# Patient Record
Sex: MALE | Race: OTHER | Employment: PART TIME | URBAN - METROPOLITAN AREA
[De-identification: names, ages, dates, MRNs, and addresses within clinical notes are randomized per-mention and may not be internally consistent; named-entity substitution may affect disease eponyms.]

---
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
## 2022-01-07 ENCOUNTER — HOSPITAL ENCOUNTER (EMERGENCY)
Age: 22
Discharge: HOME | End: 2022-01-07
Attending: EMERGENCY MEDICINE
Payer: MEDICAID

## 2022-01-07 ENCOUNTER — APPOINTMENT (OUTPATIENT)
Dept: GENERAL RADIOLOGY | Age: 22
End: 2022-01-07
Attending: EMERGENCY MEDICINE
Payer: MEDICAID

## 2022-01-07 VITALS
HEART RATE: 82 BPM | BODY MASS INDEX: 24.22 KG/M2 | SYSTOLIC BLOOD PRESSURE: 121 MMHG | TEMPERATURE: 98.5 F | HEIGHT: 71 IN | OXYGEN SATURATION: 100 % | DIASTOLIC BLOOD PRESSURE: 68 MMHG | WEIGHT: 173 LBS | RESPIRATION RATE: 22 BRPM

## 2022-01-07 DIAGNOSIS — R00.2 PALPITATIONS: Primary | ICD-10-CM

## 2022-01-07 DIAGNOSIS — F41.1 ANXIETY STATE: ICD-10-CM

## 2022-01-07 LAB — SARS-COV-2, COV2: NORMAL

## 2022-01-07 PROCEDURE — 71045 X-RAY EXAM CHEST 1 VIEW: CPT

## 2022-01-07 PROCEDURE — 93005 ELECTROCARDIOGRAM TRACING: CPT

## 2022-01-07 PROCEDURE — 99283 EMERGENCY DEPT VISIT LOW MDM: CPT

## 2022-01-07 PROCEDURE — U0005 INFEC AGEN DETEC AMPLI PROBE: HCPCS

## 2022-01-08 NOTE — DISCHARGE INSTRUCTIONS
Thank you! Thank you for allowing me to care for you in the emergency department. I sincerely hope that you are satisfied with your visit today. It is my goal to provide you with excellent care. Below you will find a list of your labs and imaging from your visit today. Should you have any questions regarding these results please do not hesitate to call the emergency department. Labs -   No results found for this or any previous visit (from the past 12 hour(s)). Radiologic Studies -   XR CHEST PORT   Final Result   Clear lungs. No effusion or pneumothorax. Normal heart and   mediastinum        CT Results  (Last 48 hours)      None          CXR Results  (Last 48 hours)                 01/07/22 2156  XR CHEST PORT Final result    Impression:  Clear lungs. No effusion or pneumothorax. Normal heart and   mediastinum       Narrative:  1 view                      If you feel that you have not received excellent quality care or timely care, please ask to speak to the nurse manager. Please choose us in the future for your continued health care needs. ------------------------------------------------------------------------------------------------------------  The exam and treatment you received in the Emergency Department were for an urgent problem and are not intended as complete care. It is important that you follow-up with a doctor, nurse practitioner, or physician assistant to:  (1) confirm your diagnosis,  (2) re-evaluation of changes in your illness and treatment, and  (3) for ongoing care. If your symptoms become worse or you do not improve as expected and you are unable to reach your usual health care provider, you should return to the Emergency Department. We are available 24 hours a day. Please take your discharge instructions with you when you go to your follow-up appointment. If you have any problem arranging a follow-up appointment, contact the Emergency Department immediately.     If a prescription has been provided, please have it filled as soon as possible to prevent a delay in treatment. Read the entire medication instruction sheet provided to you by the pharmacy. If you have any questions or reservations about taking the medication due to side effects or interactions with other medications, please call your primary care physician or contact the ER to speak with the charge nurse. Make an appointment with your family doctor or the physician you were referred to for follow-up of this visit as instructed on your discharge paperwork, as this is a mandatory follow-up. Return to the ER if you are unable to be seen or if you are unable to be seen in a timely manner. If you have any problem arranging the follow-up visit, contact the Emergency Department immediately.

## 2022-01-09 LAB
ATRIAL RATE: 72 BPM
CALCULATED P AXIS, ECG09: 80 DEGREES
CALCULATED R AXIS, ECG10: 90 DEGREES
CALCULATED T AXIS, ECG11: 58 DEGREES
DIAGNOSIS, 93000: NORMAL
P-R INTERVAL, ECG05: 182 MS
Q-T INTERVAL, ECG07: 368 MS
QRS DURATION, ECG06: 94 MS
QTC CALCULATION (BEZET), ECG08: 402 MS
SARS-COV-2, XPLCVT: NOT DETECTED
SOURCE, COVRS: NORMAL
VENTRICULAR RATE, ECG03: 72 BPM

## 2022-01-10 NOTE — ED PROVIDER NOTES
EMERGENCY DEPARTMENT HISTORY AND PHYSICAL EXAM      Date: 1/7/2022  Patient Name: Amena Gracia    History of Presenting Illness     Chief Complaint   Patient presents with    Shortness of Breath       History Provided By: Patient    HPI: Bria Gracia, 25 y.o. male with a past medical history significant No significant past medical history presents to the ED with cc of shortness of breath x 2 days. Patient states that he feels similar to when he was told his thyroid was not functioning properly. Patient states that he has never taken thyroid medication, however. He report palpitations, shortness of breath and a short temper, saying that he frequently wants to yell at people. There are no other complaints, changes, or physical findings at this time. PCP: Nelsy Stone MD    No current facility-administered medications on file prior to encounter. Current Outpatient Medications on File Prior to Encounter   Medication Sig Dispense Refill    SELENIUM PO Take  by mouth.  cholecalciferol (Vitamin D3) (1000 Units /25 mcg) tablet Take  by mouth daily.  zinc sulfate (ZINC-220 PO) Take  by mouth.  ascorbic acid, vitamin C, (Vitamin C) 250 mg tablet Take  by mouth. Past History     Past Medical History:  Past Medical History:   Diagnosis Date    Diabetes [facility])        Past Surgical History:  History reviewed. No pertinent surgical history. Family History:  History reviewed. No pertinent family history. Social History:  Social History     Tobacco Use    Smoking status: Light Tobacco Smoker    Smokeless tobacco: Never Used   Substance Use Topics    Alcohol use: Never    Drug use: Never       Allergies:  No Known Allergies      Review of Systems     Review of Systems   Constitutional: Negative for chills and fever. HENT: Negative for congestion and rhinorrhea. Eyes: Negative for photophobia and visual disturbance. Respiratory: Positive for shortness of breath.  Negative for cough. Cardiovascular: Positive for palpitations. Negative for chest pain. Gastrointestinal: Negative for abdominal pain, diarrhea, nausea and vomiting. Genitourinary: Negative for difficulty urinating and dysuria. Musculoskeletal: Negative for arthralgias and myalgias. Skin: Negative for color change and rash. Neurological: Negative for weakness and headaches. Psychiatric/Behavioral: Positive for agitation. Negative for dysphoric mood and sleep disturbance. Physical Exam     Physical Exam  Constitutional:       General: He is not in acute distress. Appearance: Normal appearance. He is not ill-appearing. HENT:      Head: Normocephalic and atraumatic. Right Ear: External ear normal.      Left Ear: External ear normal.      Nose: Nose normal.      Mouth/Throat:      Mouth: Mucous membranes are moist.   Eyes:      Extraocular Movements: Extraocular movements intact. Conjunctiva/sclera: Conjunctivae normal.      Pupils: Pupils are equal, round, and reactive to light. Cardiovascular:      Rate and Rhythm: Normal rate and regular rhythm. Pulses: Normal pulses. Pulmonary:      Effort: Pulmonary effort is normal. No respiratory distress. Breath sounds: Normal breath sounds. Abdominal:      General: Abdomen is flat. There is no distension. Musculoskeletal:         General: Normal range of motion. Cervical back: Normal range of motion. Skin:     General: Skin is warm and dry. Neurological:      General: No focal deficit present. Mental Status: He is alert and oriented to person, place, and time. Psychiatric:         Mood and Affect: Mood normal.         Behavior: Behavior normal.         Thought Content: Thought content normal.         Judgment: Judgment normal.         Lab and Diagnostic Study Results     Labs -   No results found for this or any previous visit (from the past 12 hour(s)).     Radiologic Studies -   @lastxrresult@  CT Results  (Last 50 hours)    None        CXR Results  (Last 48 hours)               01/07/22 2156  XR CHEST PORT Final result    Impression:  Clear lungs. No effusion or pneumothorax. Normal heart and   mediastinum       Narrative:  1 view                   Medical Decision Making   - I am the first provider for this patient. - I reviewed the vital signs, available nursing notes, past medical history, past surgical history, family history and social history. - Initial assessment performed. The patients presenting problems have been discussed, and they are in agreement with the care plan formulated and outlined with them. I have encouraged them to ask questions as they arise throughout their visit. Vital Signs-Reviewed the patient's vital signs. No data found. Records Reviewed: Nursing Notes    The patient presents with palpitations with a differential diagnosis of anxiety, hyperthyroidism, substance abuse/withdrawal, cardiac arrythmia, PE       ED Course:          Provider Notes (Medical Decision Making):   72-year-old otherwise healthy male presenting for evaluation of shortness of breath, palpitations over the course of the last couple of days. Patient states that he had similar symptoms last year and was told it was related to his thyroid. He is not currently taking any medication. On physical exam, patient resting comfortably in bed. He is not tachycardic. He has no lower extremity swelling. Lung sounds clear bilaterally. Patient is PERC negative. EKG, chest x-ray without abnormal findings. Do not suspect thyroid etiology of patient's symptoms based on history and physical exam.  Patient instructed to follow-up with his primary care physician if he desires further endocrinology evaluation. Patient instructed to follow-up with their primary care physician and return to the ED if they develops any new or worsening symptoms.     MDM       Procedures   Medical Decision Makingedical Decision Making  Performed by: Ana Ferro DO  PROCEDURES:  Procedures       Disposition   Disposition: Condition stable  DC- Adult Discharges: All of the diagnostic tests were reviewed and questions answered. Diagnosis, care plan and treatment options were discussed. The patient understands the instructions and will follow up as directed. The patients results have been reviewed with them. They have been counseled regarding their diagnosis. The patient verbally convey understanding and agreement of the signs, symptoms, diagnosis, treatment and prognosis and additionally agrees to follow up as recommended with their PCP in 24 - 48 hours. They also agree with the care-plan and convey that all of their questions have been answered. I have also put together some discharge instructions for them that include: 1) educational information regarding their diagnosis, 2) how to care for their diagnosis at home, as well a 3) list of reasons why they would want to return to the ED prior to their follow-up appointment, should their condition change. DC-The patient was given verbal palpitations instructions    Discharged    DISCHARGE PLAN:  1. Cannot display discharge medications since this patient is not currently admitted. 2.   Follow-up Information     Follow up With Specialties Details Why Contact Info    Fabian Delgadillo MD Internal Medicine Schedule an appointment as soon as possible for a visit   [facility]      [facility] Emergency Medicine  As needed, If symptoms worsen [address] [place] [id number] [phone]        3. Return to ED if worse   4. Discharge Medication List as of 1/7/2022 10:30 PM            Diagnosis     Clinical Impression:   1. Palpitations    2. Anxiety state        Attestations:    Ana Ferro DO    Please note that this dictation was completed with [employer], the computer voice recognition software. Quite often unanticipated grammatical, syntax, homophones, and other interpretive errors are inadvertently transcribed by the computer software. Please disregard these errors. Please excuse any errors that have escaped final proofreading. Thank you.

## 2022-12-10 ENCOUNTER — HOSPITAL ENCOUNTER (EMERGENCY)
Age: 22
Discharge: HOME | End: 2022-12-10
Payer: MEDICAID

## 2022-12-10 VITALS
HEIGHT: 71 IN | DIASTOLIC BLOOD PRESSURE: 61 MMHG | HEART RATE: 100 BPM | SYSTOLIC BLOOD PRESSURE: 99 MMHG | BODY MASS INDEX: 27.72 KG/M2 | OXYGEN SATURATION: 100 % | TEMPERATURE: 97.6 F | WEIGHT: 198 LBS | RESPIRATION RATE: 16 BRPM
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
## 2022-12-10 PROCEDURE — 99283 EMERGENCY DEPT VISIT LOW MDM: CPT

## 2022-12-10 PROCEDURE — 74011250637 HC RX REV CODE- 250/637: Performed by: NURSE PRACTITIONER

## 2022-12-10 RX ORDER — BACITRACIN 500 [USP'U]/G
OINTMENT TOPICAL 3 TIMES DAILY
Qty: 28 G | Refills: 0 | Status: SHIPPED | OUTPATIENT
Start: 2022-12-10 | End: 2022-12-14

## 2022-12-10 RX ORDER — IBUPROFEN 800 MG/1
800 TABLET ORAL
Status: COMPLETED | OUTPATIENT
Start: 2022-12-10 | End: 2022-12-10

## 2022-12-10 RX ORDER — NAPROXEN 500 MG/1
500 TABLET ORAL 2 TIMES DAILY WITH MEALS
Qty: 20 TABLET | Refills: 0 | Status: SHIPPED | OUTPATIENT
Start: 2022-12-10 | End: 2022-12-14

## 2022-12-10 RX ORDER — AMOXICILLIN AND CLAVULANATE POTASSIUM 875; 125 MG/1; MG/1
1 TABLET, FILM COATED ORAL 2 TIMES DAILY
Qty: 20 TABLET | Refills: 0 | Status: SHIPPED | OUTPATIENT
Start: 2022-12-10 | End: 2022-12-20

## 2022-12-10 RX ADMIN — IBUPROFEN 800 MG: 800 TABLET, FILM COATED ORAL at 20:18

## 2022-12-10 NOTE — Clinical Note
[address] EMERGENCY DEPARTMENT  [address] [facility] [address]  [phone]    Work/School Note    Date: 12/10/2022    To Whom It May concern:      Bria Gracia was seen and treated today in the emergency room by the following provider(s):  Nurse Practitioner: Linn Almeida NP. Avery Boyle is excused from work/school on 12/10/22. He is clear to return to work/school on 12/11/22.         Sincerely,          Sera Figueroa NP

## 2022-12-11 NOTE — ED TRIAGE NOTES
Pt was playing with his dog and dog bit patient in several spots including a puncture wound to right shin area, left forearm, right middle finger and upper right arm. Pt has given dog shots but is unsure which ones and has no records.

## 2022-12-11 NOTE — ED PROVIDER NOTES
EMERGENCY DEPARTMENT HISTORY AND PHYSICAL EXAM      Date: 12/10/2022  Patient Name: Eddie Gracia    History of Presenting Illness     Chief Complaint   Patient presents with    Dog Bite       History Provided By: Patient    HPI: Bria Gracia, 23 y.o. male c/o dog bite prior to arrival. He reports multiple abrasions and puncture wound to hands, upper arms, and leg. He reports getting in the middle of his pit bulls who were playing but started to get aggressive. He reports dogs have been vaccinated up to 12 weeks and have  not received any additional vaccinations at this time. He reports one dog is 1 and the other is 1years old. He reports being allergic to tetanus. Denies any fever, drainage, chills, edema, warmth to sites. Off note pt denies hx of diabetes. No on any medications     There are no other complaints, changes, or physical findings at this time. Past History     Past Medical History:  Past Medical History:   Diagnosis Date    Diabetes [facility])        Past Surgical History:  History reviewed. No pertinent surgical history. Family History:  Family History   Problem Relation Age of Onset    Hypertension Father        Social History:  Social History     Tobacco Use    Smoking status: Light Smoker    Smokeless tobacco: Never   Vaping Use    Vaping Use: Every day    Substances: Nicotine   Substance Use Topics    Alcohol use: Never    Drug use: Never       Allergies:  No Known Allergies    PCP: Randolph Cabral MD    No current facility-administered medications on file prior to encounter. Current Outpatient Medications on File Prior to Encounter   Medication Sig Dispense Refill    SELENIUM PO Take  by mouth. cholecalciferol (Vitamin D3) (1000 Units /25 mcg) tablet Take  by mouth daily. zinc sulfate (ZINC-220 PO) Take  by mouth. ascorbic acid, vitamin C, (Vitamin C) 250 mg tablet Take  by mouth. Review of Systems   Review of Systems   Constitutional:  Negative for chills and fever. Musculoskeletal:  Negative for arthralgias, gait problem and myalgias. Skin:  Positive for wound. Neurological:  Negative for weakness and numbness. All other systems reviewed and are negative. Physical Exam   Physical Exam  Vitals and nursing note reviewed. Constitutional:       General: He is not in acute distress. Appearance: Normal appearance. He is normal weight. He is not ill-appearing or toxic-appearing. HENT:      Head: Normocephalic and atraumatic. Nose: Nose normal.      Mouth/Throat:      Mouth: Mucous membranes are moist.   Eyes:      Extraocular Movements: Extraocular movements intact. Pupils: Pupils are equal, round, and reactive to light. Cardiovascular:      Rate and Rhythm: Normal rate and regular rhythm. Pulses: Normal pulses. Pulmonary:      Effort: Pulmonary effort is normal. No respiratory distress. Musculoskeletal:         General: Normal range of motion. Cervical back: Normal range of motion and neck supple. Skin:     General: Skin is warm and dry. Capillary Refill: Capillary refill takes less than 2 seconds. Findings: Abrasion and erythema present. Comments: Multiple abrasions with erythema noted to bilateral upper extremities. Puncture wound noted to left hand, and abrasion to right lower leg. 2 Plus distal pulses, less than 2-second capillary refill. Positive sensation bilaterally. No drainage, warmth, or edema  noted to site   Neurological:      Mental Status: He is alert and oriented to person, place, and time. Lab and Diagnostic Study Results   Labs -   No results found for this or any previous visit (from the past 12 hour(s)).     Radiologic Studies -   @lastxrresult@  CT Results  (Last 48 hours)      None          CXR Results  (Last 48 hours)      None            Medical Decision Making and ED Course   Differential Diagnosis & Medical Decision Making Provider Note: abrasion, laceration, puncture wound, infection    Patient afebrile not tachycardic SPO2 100% on room air. Hemodynamically stable no active bleeding abrasions noted to bilateral upper extremities and right lower extremity with 1 puncture wound noted to left hand. Clean with soap and water, irrigated with saline. Patient deferred tetanus shot as he reports he is allergic to it. Patient reports dogs and family dogs however has not had vaccinations tested 12 weeks. Patient advised to quarantine for signs and symptoms of rabies advised signs and symptoms to follow-up to emergency room increased risk for infection due to dog bite. Patient verbalized understanding stable at time of discharge      - I am the first provider for this patient. I reviewed the vital signs, available nursing notes, past medical history, past surgical history, family history and social history. The patients presenting problems have been discussed, and they are in agreement with the care plan formulated and outlined with them. I have encouraged them to ask questions as they arise throughout their visit. Vital Signs-Reviewed the patient's vital signs. Patient Vitals for the past 12 hrs:   Temp Pulse Resp BP SpO2   12/10/22 1949 97.6 °F (36.4 °C) 100 16 99/61 100 %       ED Course:        Procedures   Performed by: Roberta Mendez NP  Procedures      Disposition   Disposition: DC- Adult Discharges: All of the diagnostic tests were reviewed and questions answered. Diagnosis, care plan and treatment options were discussed. The patient understands the instructions and will follow up as directed. The patients results have been reviewed with them. They have been counseled regarding their diagnosis. The patient verbally convey understanding and agreement of the signs, symptoms, diagnosis, treatment and prognosis and additionally agrees to follow up as recommended with their PCP in 24 - 48 hours.   They also agree with the care-plan and convey that all of their questions have been answered. I have also put together some discharge instructions for them that include: 1) educational information regarding their diagnosis, 2) how to care for their diagnosis at home, as well a 3) list of reasons why they would want to return to the ED prior to their follow-up appointment, should their condition change. DISCHARGE PLAN:  1. Current Discharge Medication List        START taking these medications    Details   amoxicillin-clavulanate (Augmentin) 875-125 mg per tablet Take 1 Tablet by mouth two (2) times a day for 10 days. Qty: 20 Tablet, Refills: 0      naproxen (Naprosyn) 500 mg tablet Take 1 Tablet by mouth two (2) times daily (with meals) for 10 days. Qty: 20 Tablet, Refills: 0      bacitracin (BACITRACIN) 500 unit/gram oint Apply  to affected area three (3) times daily for 10 days. Apply to affected area  Qty: 28 g, Refills: 0           CONTINUE these medications which have NOT CHANGED    Details   SELENIUM PO Take  by mouth. cholecalciferol (Vitamin D3) (1000 Units /25 mcg) tablet Take  by mouth daily. zinc sulfate (ZINC-220 PO) Take  by mouth. ascorbic acid, vitamin C, (Vitamin C) 250 mg tablet Take  by mouth. 2.   Follow-up Information       Follow up With Specialties Details Why Contact Info    Paula Garcia MD Internal Medicine Physician Schedule an appointment as soon as possible for a visit in 2 days For wound re-check [facility]  [phone]            3. Return to ED if worse   4. Current Discharge Medication List        START taking these medications    Details   amoxicillin-clavulanate (Augmentin) 875-125 mg per tablet Take 1 Tablet by mouth two (2) times a day for 10 days. Qty: 20 Tablet, Refills: 0  Start date: 12/10/2022, End date: 12/20/2022      naproxen (Naprosyn) 500 mg tablet Take 1 Tablet by mouth two (2) times daily (with meals) for 10 days.   Qty: 20 Tablet, Refills: 0  Start date: 12/10/2022, End date: 12/20/2022      bacitracin (BACITRACIN) 500 unit/gram oint Apply  to affected area three (3) times daily for 10 days. Apply to affected area  Qty: 28 g, Refills: 0  Start date: 12/10/2022, End date: 12/20/2022             Diagnosis/Clinical Impression     Clinical Impression:   1. Dog bite, initial encounter        Attestations: Angella MILLER NP, am the primary clinician of record. Please note that this dictation was completed with "[employer] the [employer] voice recognition software. Quite often unanticipated grammatical, syntax, homophones, and other interpretive errors are inadvertently transcribed by the computer software. Please disregard these errors. Please excuse any errors that have escaped final proofreading. Thank you.

## 2022-12-13 PROBLEM — E11.9 DIABETES (HCC): Status: ACTIVE | Noted: 2022-12-13

## 2022-12-13 PROBLEM — R00.2 INTERMITTENT PALPITATIONS: Status: ACTIVE | Noted: 2022-12-13

## 2022-12-13 LAB
CREATININE, EXTERNAL: 0.84
LDL-C, EXTERNAL: 136
TOTAL CHOLESTEROL, NCHOLT: 192

## 2022-12-14 ENCOUNTER — OFFICE VISIT (OUTPATIENT)
Dept: INTERNAL MEDICINE CLINIC | Age: 22
End: 2022-12-14
Payer: MEDICAID

## 2022-12-14 VITALS
WEIGHT: 189 LBS | OXYGEN SATURATION: 99 % | HEIGHT: 71 IN | HEART RATE: 61 BPM | BODY MASS INDEX: 26.46 KG/M2 | DIASTOLIC BLOOD PRESSURE: 70 MMHG | TEMPERATURE: 98.1 F | SYSTOLIC BLOOD PRESSURE: 114 MMHG

## 2022-12-14 DIAGNOSIS — R79.89 LOW TSH LEVEL: ICD-10-CM

## 2022-12-14 DIAGNOSIS — R55 NEAR SYNCOPE: Primary | ICD-10-CM

## 2022-12-14 DIAGNOSIS — E78.5 DYSLIPIDEMIA: ICD-10-CM
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
## 2022-12-14 PROCEDURE — 99214 OFFICE O/P EST MOD 30 MIN: CPT | Performed by: INTERNAL MEDICINE

## 2022-12-14 NOTE — PROGRESS NOTES
Chief Complaint   Patient presents with    Chest Pain    Labs    Follow Up Chronic Condition     1. Have you been to the ER, urgent care clinic since your last visit? Hospitalized since your last visit? No    2. Have you seen or consulted any other health care providers outside of the [address] [place] [address] since your last visit? Include any pap smears or colon screening.  No

## 2022-12-14 NOTE — PROGRESS NOTES
[address] [facility] Internal Medicine  [address]  [place], [address] [facility]  Phone: [phone]      Mayra Longo (: 2000) is a 23 y.o. male, established patient, here for evaluation of the following chief complaint(s):  Chest Pain, Labs, Follow Up Chronic Condition, and Shortness of Breath         SUBJECTIVE/OBJECTIVE:  HPI:  Patient is here for follow up, he recently had blood work. He states that Friday night, he was outside, running around, he got wet, and he was outside for about 10 minutes, breaking up a dog fight. He went in the house, he then he was on the floor for about 30 minutes. He was excessively sweating, and his HR was elevated 115-125. He could not move or talk. He had nauseated but no vomiting. He states that it had been happening about 2xs a day but not that bad. He states that this morning and he felt the same way. He had it again this morning, he went back to sleep and he just woke up and he feeling slightly better. He feels dyspnea and he still has an elevated HR. He has to sit down and relax for about 5 minutes to feel better. Patient stated while he was trying to break up the fights between his dogs he got bit on his legs and arms and he was evaluated in the emergency room and was started on Augmentin. He stated his dogs are fully vaccinated and he is monitoring them and they do not have any abnormal behavior. 2022 Hemo 13.44, Hema 39.7, Glu 94, Creat 0.84, K+ 4.2, Chol T 192, Trig 97, HDL 38, , TSH pending. Prior to Admission medications    Medication Sig Start Date End Date Taking? Authorizing Provider   amoxicillin-clavulanate (Augmentin) 875-125 mg per tablet Take 1 Tablet by mouth two (2) times a day for 10 days. 12/10/22 12/20/22 Yes Aby Flores, NP   SELENIUM PO Take  by mouth. Yes Provider, Historical   cholecalciferol (VITAMIN D3) (1000 Units /25 mcg) tablet Take  by mouth daily.    Yes Provider, Historical   zinc sulfate (ZINC-220 PO) Take  by mouth. Yes Provider, Historical   ascorbic acid, vitamin C, (VITAMIN C) 250 mg tablet Take  by mouth. Yes Provider, Historical        Allergies   Allergen Reactions    Tetanus-Diphtheria Toxoids-Td Unknown (comments)        Past Medical History:   Diagnosis Date    Intermittent palpitations         Family History   Problem Relation Age of Onset    Hypertension Father         History reviewed. No pertinent surgical history. Review of Systems   Constitutional:  Positive for fatigue. Negative for chills and fever. HENT:  Negative for congestion, ear pain, nosebleeds, sinus pain, sore throat and tinnitus. Eyes:  Negative for redness. Respiratory:  Positive for shortness of breath. Negative for cough. Cardiovascular:  Positive for palpitations. Negative for chest pain. Gastrointestinal:  Positive for nausea. Negative for abdominal pain, diarrhea and vomiting. Endocrine: Negative for cold intolerance and polyuria. Genitourinary:  Negative for dysuria and hematuria. Musculoskeletal:  Negative for back pain and neck pain. Skin:  Negative for rash. Neurological:  Negative for dizziness and headaches. Psychiatric/Behavioral: Negative. /70   Pulse 61   Temp 98.1 °F (36.7 °C) (Temporal)   Ht 5' 11\" (1.803 m)   Wt 189 lb (85.7 kg)   SpO2 99%   BMI 26.36 kg/m²      Physical Exam  Constitutional:       General: He is not in acute distress. Appearance: Normal appearance. Comments: Patient with his father   HENT:      Head: Normocephalic and atraumatic. Right Ear: External ear normal.      Left Ear: External ear normal.      Nose: Nose normal.      Mouth/Throat:      Mouth: Mucous membranes are moist.   Eyes:      Extraocular Movements: Extraocular movements intact. Pupils: Pupils are equal, round, and reactive to light. Cardiovascular:      Rate and Rhythm: Normal rate and regular rhythm.    Pulmonary:      Effort: Pulmonary effort is normal.      Breath sounds: Normal breath sounds. Abdominal:      General: Bowel sounds are normal.      Palpations: Abdomen is soft. Tenderness: There is no abdominal tenderness. Musculoskeletal:         General: Normal range of motion. Cervical back: Neck supple. Skin:     General: Skin is warm and dry. Comments: Multiple healing bite marks over the right leg left thigh and left forearm, no surrounding redness or no drainage   Neurological:      General: No focal deficit present. Mental Status: He is alert and oriented to person, place, and time. Psychiatric:         Mood and Affect: Mood normal.       ASSESSMENT/PLAN:  Below is the assessment and plan developed based on review of pertinent history, physical exam, labs, studies, and medications. 1. Near syncope  Comments:  Patient is describing episodes of chest pain, palpitation and almost fainting spells. His CBC is within normal limit, CMP unremarkable, TSH was low previously, TSH and T3-T4 are pending. .  ER records and labs reviewed with the patient and his father. Orders:  -     REFERRAL TO CARDIOLOGY  2. Dog bite, initial encounter  Comments:  ER records reviewed, cannot take tetanus as he is allergic to tetanus, his dogs are vaccinated, wound is healing, advised to continue Augmentin  3. Dyslipidemia  Comments: Total cholesterol 192, triglyceride 97, HDL 38, , LFTs normal advised low-fat diet and increase exercise  4. Low TSH level  Comments:  TSH was low at 0.338 in June 2020 2 repeat TSH and T3-T4 are pending,if  patient has hypothyroidism will get Endo consult    Return in about 6 months (around 6/14/2023). There are no Patient Instructions on file for this visit.      Health Maintenance Due   Topic Date Due    Hepatitis C Screening  Never done    COVID-19 Vaccine (1) Never done    Pneumococcal 0-64 years (1 - PCV) Never done    DTaP/Tdap/Td series (1 - Tdap) Never done    Foot Exam Q1  Never done    A1C test (Diabetic or Prediabetic)  Never done    MICROALBUMIN Q1  Never done    Eye Exam Retinal or Dilated  Never done    HPV Age 9Y-34Y (1 - Male 2-dose series) Never done    Hepatitis B Vaccine (1 of 3 - Risk 3-dose series) Never done    Depression Screen  02/02/2022    Flu Vaccine (1) Never done        Aspects of this note may have been generated using voice recognition software. Despite editing, there may be unrecognized errors. An electronic signature was used to authenticate this note.   -- Lexa Lacey MD

## 2024-08-04 ENCOUNTER — HOSPITAL ENCOUNTER (EMERGENCY)
Facility: HOSPITAL | Age: 24
Discharge: HOME | End: 2024-08-04
Attending: EMERGENCY MEDICINE
Payer: MEDICAID

## 2024-08-04 ENCOUNTER — APPOINTMENT (OUTPATIENT)
Facility: HOSPITAL | Age: 24
End: 2024-08-04
Payer: MEDICAID

## 2024-08-04 VITALS
WEIGHT: 188 LBS | RESPIRATION RATE: 18 BRPM | TEMPERATURE: 98 F | HEIGHT: 72 IN | DIASTOLIC BLOOD PRESSURE: 67 MMHG | BODY MASS INDEX: 25.47 KG/M2 | OXYGEN SATURATION: 100 % | SYSTOLIC BLOOD PRESSURE: 124 MMHG | HEART RATE: 60 BPM

## 2024-08-04 DIAGNOSIS — R07.89 CHEST WALL PAIN: Primary | ICD-10-CM

## 2024-08-04 PROCEDURE — 6370000000 HC RX 637 (ALT 250 FOR IP): Performed by: EMERGENCY MEDICINE

## 2024-08-04 PROCEDURE — 99284 EMERGENCY DEPT VISIT MOD MDM: CPT

## 2024-08-04 PROCEDURE — 93005 ELECTROCARDIOGRAM TRACING: CPT | Performed by: EMERGENCY MEDICINE

## 2024-08-04 PROCEDURE — 71250 CT THORAX DX C-: CPT

## 2024-08-04 RX ORDER — IBUPROFEN 800 MG/1
800 TABLET ORAL
Status: COMPLETED | OUTPATIENT
Start: 2024-08-04 | End: 2024-08-04

## 2024-08-04 RX ORDER — ACETAMINOPHEN 500 MG
1000 TABLET ORAL
Status: COMPLETED | OUTPATIENT
Start: 2024-08-04 | End: 2024-08-04

## 2024-08-04 RX ORDER — IBUPROFEN 200 MG
400 TABLET ORAL EVERY 8 HOURS PRN
Qty: 20 TABLET | Refills: 0 | Status: SHIPPED | OUTPATIENT
Start: 2024-08-04 | End: 2024-08-11

## 2024-08-04 RX ORDER — ACETAMINOPHEN 500 MG
1000 TABLET ORAL EVERY 8 HOURS PRN
Qty: 360 TABLET | Refills: 1 | Status: SHIPPED | OUTPATIENT
Start: 2024-08-04

## 2024-08-04 RX ADMIN — IBUPROFEN 800 MG: 800 TABLET, FILM COATED ORAL at 04:15

## 2024-08-04 RX ADMIN — ACETAMINOPHEN 1000 MG: 500 TABLET ORAL at 04:15

## 2024-08-04 ASSESSMENT — PAIN - FUNCTIONAL ASSESSMENT: PAIN_FUNCTIONAL_ASSESSMENT: 0-10

## 2024-08-04 ASSESSMENT — PAIN SCALES - GENERAL: PAINLEVEL_OUTOF10: 5

## 2024-08-04 ASSESSMENT — LIFESTYLE VARIABLES
HOW OFTEN DO YOU HAVE A DRINK CONTAINING ALCOHOL: NEVER
HOW MANY STANDARD DRINKS CONTAINING ALCOHOL DO YOU HAVE ON A TYPICAL DAY: PATIENT DOES NOT DRINK

## 2024-08-04 NOTE — ED TRIAGE NOTES
Pt states as he was laying down to fall asleep he was having rib pain and \"chest bone pain\" for almost a week now.

## 2024-08-04 NOTE — ED PROVIDER NOTES
[facility] EMERGENCY DEPT  EMERGENCY DEPARTMENT HISTORY AND PHYSICAL EXAM      Date: 8/4/2024  Patient Name: Tunde Aguayo  MRN: [id number]  Birthdate 2000  Date of evaluation: 8/4/2024  Provider: Celestino Rao MD  Note Started: 4:12 AM EDT 8/4/24    HISTORY OF PRESENT ILLNESS     Chief Complaint   Patient presents with    Rib Pain    Chest Pain       History Provided By: Patient    HPI: Tunde Aguayo is a 24 y.o. male.   Patient presents with intermittent episode of sternal and bilateral lateral chest pain for 1 week.  No injury.  Pain has been constant in mild to moderate degree with movement aggravating pain.  Patient also states that he wakes up in the morning with worsening pain.  No cough.  No fever or chills.  No abdominal pain.  No pleuritic chest pain.  No cardiac history.  Patient denies use of illicit drugs.  No OTC treatment        PAST MEDICAL HISTORY   Past Medical History:  Past Medical History:   Diagnosis Date    Intermittent palpitations        Past Surgical History:  Past Surgical History:   Procedure Laterality Date    KNEE SURGERY Left        Family History:  Family History   Problem Relation Age of Onset    Hypertension Father        Social History:  Social History     Tobacco Use    Smoking status: Light Smoker    Smokeless tobacco: Never   Vaping Use    Vaping Use: Every day   Substance Use Topics    Alcohol use: Never    Drug use: Never       Allergies:  No Known Allergies    PCP: Wicho Montanez MD    Current Meds:   No current facility-administered medications for this encounter.     Current Outpatient Medications   Medication Sig Dispense Refill    acetaminophen (TYLENOL) 500 MG tablet Take 2 tablets by mouth every 8 hours as needed for Fever or Pain 360 tablet 1    ibuprofen (ADVIL) 200 MG tablet Take 2 tablets by mouth every 8 hours as needed for Pain 20 tablet 0    SELENIUM PO Take by mouth      ascorbic acid (VITAMIN C) 250 MG tablet Take by mouth      vitamin D

## 2024-08-06 LAB
EKG ATRIAL RATE: 57 BPM
EKG DIAGNOSIS: NORMAL
EKG P AXIS: 30 DEGREES
EKG P-R INTERVAL: 174 MS
EKG Q-T INTERVAL: 416 MS
EKG QRS DURATION: 92 MS
EKG QTC CALCULATION (BAZETT): 404 MS
EKG R AXIS: 55 DEGREES
EKG T AXIS: 46 DEGREES
EKG VENTRICULAR RATE: 57 BPM

## 2024-12-24 ENCOUNTER — APPOINTMENT (OUTPATIENT)
Facility: HOSPITAL | Age: 24
End: 2024-12-24
Payer: MEDICAID

## 2024-12-24 ENCOUNTER — HOSPITAL ENCOUNTER (EMERGENCY)
Facility: HOSPITAL | Age: 24
Discharge: HOME | End: 2024-12-24
Attending: STUDENT IN AN ORGANIZED HEALTH CARE EDUCATION/TRAINING PROGRAM
Payer: MEDICAID

## 2024-12-24 VITALS
SYSTOLIC BLOOD PRESSURE: 116 MMHG | TEMPERATURE: 97.8 F | RESPIRATION RATE: 17 BRPM | HEIGHT: 72 IN | HEART RATE: 65 BPM | BODY MASS INDEX: 25.47 KG/M2 | DIASTOLIC BLOOD PRESSURE: 67 MMHG | WEIGHT: 188 LBS | OXYGEN SATURATION: 98 %

## 2024-12-24 DIAGNOSIS — S69.91XA INJURY OF FINGER OF RIGHT HAND, INITIAL ENCOUNTER: Primary | ICD-10-CM

## 2024-12-24 DIAGNOSIS — S60.10XA HEMATOMA, SUBUNGUAL, FINGER, RIGHT, INITIAL ENCOUNTER: ICD-10-CM

## 2024-12-24 PROCEDURE — 11740 EVACUATION SUBUNGUAL HMTMA: CPT

## 2024-12-24 PROCEDURE — 73140 X-RAY EXAM OF FINGER(S): CPT

## 2024-12-24 PROCEDURE — 99283 EMERGENCY DEPT VISIT LOW MDM: CPT

## 2024-12-24 ASSESSMENT — PAIN - FUNCTIONAL ASSESSMENT: PAIN_FUNCTIONAL_ASSESSMENT: 0-10

## 2024-12-24 ASSESSMENT — PAIN DESCRIPTION - LOCATION: LOCATION: FINGER (COMMENT WHICH ONE)

## 2024-12-24 ASSESSMENT — PAIN DESCRIPTION - DESCRIPTORS: DESCRIPTORS: ACHING;PRESSURE

## 2024-12-24 ASSESSMENT — PAIN SCALES - GENERAL: PAINLEVEL_OUTOF10: 8

## 2024-12-24 NOTE — ED PROVIDER NOTES
[facility] EMERGENCY DEPT  EMERGENCY DEPARTMENT HISTORY AND PHYSICAL EXAM      Date: 12/24/2024  Patient Name: Tunde Aguayo  MRN: [id number]  YOB: 2000  Date of evaluation: 12/24/2024  Provider: Dm Webster MD   Note Started: 8:16 AM EST 12/24/24    HISTORY OF PRESENT ILLNESS     Chief Complaint   Patient presents with    Finger Pain       History Provided By: Patient    HPI: Tunde Aguayo is a 24 y.o. male presents to the emergency department for evaluation of right index and middle finger pain, closed fingers in door yesterday evening.  Complaining of significant pain to index finger, no numbness tingling, able to fully flex and extend distal phalanx, denies any other injuries.    PAST MEDICAL HISTORY   Past Medical History:  Past Medical History:   Diagnosis Date    Intermittent palpitations        Past Surgical History:  Past Surgical History:   Procedure Laterality Date    KNEE SURGERY Left        Family History:  Family History   Problem Relation Age of Onset    Hypertension Father        Social History:  Social History     Tobacco Use    Smoking status: Light Smoker    Smokeless tobacco: Never   Vaping Use    Vaping status: Every Day    Substances: Nicotine    Devices: Disposable   Substance Use Topics    Alcohol use: Never    Drug use: Never       Allergies:  No Known Allergies    PCP: Wicho Montanez MD    Current Meds:   No current facility-administered medications for this encounter.     Current Outpatient Medications   Medication Sig Dispense Refill    acetaminophen (TYLENOL) 500 MG tablet Take 2 tablets by mouth every 8 hours as needed for Fever or Pain (Patient not taking: Reported on 12/24/2024) 360 tablet 1    ibuprofen (ADVIL) 200 MG tablet Take 2 tablets by mouth every 8 hours as needed for Pain 20 tablet 0    SELENIUM PO Take by mouth (Patient not taking: Reported on 12/24/2024)      ascorbic acid (VITAMIN C) 250 MG tablet Take by mouth (Patient not taking: Reported on 12/24/2024)

## 2024-12-24 NOTE — ED TRIAGE NOTES
Yesterday  Slammed right 2nd and 3rd finger in car door, right 2nd distal finger pain, blood noted under nail bed, 3rd finger also involved but no pain,   Able to bend fingers but pain to 2nd finger that has shooting pain going up arm   No deformity noted